# Patient Record
Sex: MALE | Race: WHITE | URBAN - METROPOLITAN AREA
[De-identification: names, ages, dates, MRNs, and addresses within clinical notes are randomized per-mention and may not be internally consistent; named-entity substitution may affect disease eponyms.]

---

## 2017-03-06 ENCOUNTER — TELEPHONE (OUTPATIENT)
Dept: INTERNAL MEDICINE CLINIC | Facility: CLINIC | Age: 67
End: 2017-03-06

## 2017-03-06 RX ORDER — AZITHROMYCIN 250 MG/1
TABLET, FILM COATED ORAL
Qty: 6 TABLET | Refills: 1 | Status: SHIPPED | OUTPATIENT
Start: 2017-03-06 | End: 2018-05-24

## 2017-03-06 NOTE — TELEPHONE ENCOUNTER
To Dr. Troy Fraga - pt's wife states pt needs refill of zpak to use, if needed, when traveling - pharmacy info updated.

## 2017-03-06 NOTE — TELEPHONE ENCOUNTER
Pt wife would like to speak to a nurse regarding rx. Wife stated pt usually gets in rx when travelling in antibiotic.

## 2017-03-07 NOTE — TELEPHONE ENCOUNTER
ERx sent for Zithromax (Z-nitesh) as directed x5d, 1RF; ERx sent to [address] in [place]; please call pt Carmleo Cisneros

## 2017-03-17 RX ORDER — PROMETHAZINE HYDROCHLORIDE AND CODEINE PHOSPHATE 6.25; 1 MG/5ML; MG/5ML
5 SYRUP ORAL EVERY 6 HOURS PRN
Qty: 180 ML | Refills: 1 | Status: SHIPPED
Start: 2017-03-17 | End: 2017-05-15

## 2017-03-17 NOTE — TELEPHONE ENCOUNTER
Patient is at the pharmacy now. Patient has a cough per pharmacist.  Wants cough syrup with codeine (has an understanding with the doctor). Patient recently got a Z-nitesh.

## 2017-03-28 NOTE — TELEPHONE ENCOUNTER
Pt called back and while he uses the Tylenol #3 very sparingly he needs it for pain control and the Ambien he needs for sleep assistance. Please advise  To MD:  The above refill request is for a controlled substance.   Please indicate yes or no to refill 30

## 2017-03-28 NOTE — TELEPHONE ENCOUNTER
[facility] faxed a refill request for: Tylenol #3 w/coedine,  Zolpidem 5 mg tabs #30 qty. Last filled: 02/19/17    Fax.  # [phone] Routed to Rx

## 2017-03-29 RX ORDER — ZOLPIDEM TARTRATE 5 MG/1
TABLET ORAL
Qty: 30 TABLET | Refills: 0 | COMMUNITY
Start: 2017-03-29 | End: 2017-04-24

## 2017-03-29 RX ORDER — ACETAMINOPHEN AND CODEINE PHOSPHATE 300; 30 MG/1; MG/1
TABLET ORAL
Qty: 30 TABLET | Refills: 0 | COMMUNITY
Start: 2017-03-29 | End: 2017-05-15

## 2017-03-29 NOTE — TELEPHONE ENCOUNTER
Ok to fill T#3: #30, no refill. Ok to fill [place] #30, no refill.      Further refills per Dr. Lilly [place]

## 2017-04-24 RX ORDER — ZOLPIDEM TARTRATE 5 MG/1
TABLET ORAL
Qty: 30 TABLET | Refills: 5 | Status: SHIPPED
Start: 2017-04-24 | End: 2017-10-16

## 2017-04-24 NOTE — TELEPHONE ENCOUNTER
To Dr. Pratik Ragland - see below. LMTCB for pt to verify if this is a permanent pharmacy move. To MD:  The above refill request is for a controlled substance. Please indicate yes or no to refill 30 days supply plus one refill.   If more refills are appropriat

## 2017-05-01 ENCOUNTER — TELEPHONE (OUTPATIENT)
Dept: INTERNAL MEDICINE CLINIC | Facility: CLINIC | Age: 67
End: 2017-05-01

## 2017-05-01 DIAGNOSIS — Z12.5 SCREENING PSA (PROSTATE SPECIFIC ANTIGEN): ICD-10-CM

## 2017-05-01 DIAGNOSIS — I10 BENIGN HYPERTENSION: ICD-10-CM

## 2017-05-01 DIAGNOSIS — Z00.00 PHYSICAL EXAM, ANNUAL: Primary | ICD-10-CM

## 2017-05-01 NOTE — TELEPHONE ENCOUNTER
Pt. Is calling to request an appt. For a physical on 05/12 or 05/15 pt.  Lives in [place] now   Ph. # [phone]

## 2017-05-15 ENCOUNTER — LAB ENCOUNTER (OUTPATIENT)
Dept: LAB | Age: 67
End: 2017-05-15
Attending: INTERNAL MEDICINE
Payer: COMMERCIAL

## 2017-05-15 ENCOUNTER — OFFICE VISIT (OUTPATIENT)
Dept: INTERNAL MEDICINE CLINIC | Facility: CLINIC | Age: 67
End: 2017-05-15

## 2017-05-15 VITALS
BODY MASS INDEX: 29.58 KG/M2 | HEIGHT: 70 IN | TEMPERATURE: 98 F | HEART RATE: 85 BPM | WEIGHT: 206.63 LBS | SYSTOLIC BLOOD PRESSURE: 160 MMHG | OXYGEN SATURATION: 97 % | DIASTOLIC BLOOD PRESSURE: 80 MMHG

## 2017-05-15 DIAGNOSIS — M54.16 LUMBAR RADICULOPATHY: ICD-10-CM

## 2017-05-15 DIAGNOSIS — Z12.5 SCREENING PSA (PROSTATE SPECIFIC ANTIGEN): ICD-10-CM

## 2017-05-15 DIAGNOSIS — Z00.00 PHYSICAL EXAM, ANNUAL: ICD-10-CM

## 2017-05-15 DIAGNOSIS — Z00.00 PHYSICAL EXAM, ANNUAL: Primary | ICD-10-CM

## 2017-05-15 DIAGNOSIS — R39.15 URINARY URGENCY: ICD-10-CM

## 2017-05-15 DIAGNOSIS — F51.01 PRIMARY INSOMNIA: ICD-10-CM

## 2017-05-15 DIAGNOSIS — R03.0 ELEVATED BP WITHOUT DIAGNOSIS OF HYPERTENSION: ICD-10-CM

## 2017-05-15 DIAGNOSIS — K21.9 GASTROESOPHAGEAL REFLUX DISEASE, ESOPHAGITIS PRESENCE NOT SPECIFIED: ICD-10-CM

## 2017-05-15 DIAGNOSIS — R07.9 CHEST PAIN, UNSPECIFIED TYPE: ICD-10-CM

## 2017-05-15 DIAGNOSIS — R94.31 ABNORMAL EKG: ICD-10-CM

## 2017-05-15 PROCEDURE — 99397 PER PM REEVAL EST PAT 65+ YR: CPT | Performed by: INTERNAL MEDICINE

## 2017-05-15 PROCEDURE — 85025 COMPLETE CBC W/AUTO DIFF WBC: CPT

## 2017-05-15 PROCEDURE — 87389 HIV-1 AG W/HIV-1&-2 AB AG IA: CPT

## 2017-05-15 PROCEDURE — 80053 COMPREHEN METABOLIC PANEL: CPT

## 2017-05-15 PROCEDURE — 84443 ASSAY THYROID STIM HORMONE: CPT

## 2017-05-15 PROCEDURE — 80061 LIPID PANEL: CPT

## 2017-05-15 PROCEDURE — 36415 COLL VENOUS BLD VENIPUNCTURE: CPT

## 2017-05-15 RX ORDER — PANTOPRAZOLE SODIUM 40 MG/1
TABLET, DELAYED RELEASE ORAL
Qty: 30 TABLET | Refills: 11 | Status: SHIPPED | OUTPATIENT
Start: 2017-05-15

## 2017-05-15 RX ORDER — LIDOCAINE 50 MG/G
1 PATCH TOPICAL
COMMUNITY

## 2017-05-15 RX ORDER — METHOCARBAMOL 750 MG/1
750 TABLET, FILM COATED ORAL EVERY 8 HOURS PRN
COMMUNITY
End: 2017-05-15

## 2017-05-15 RX ORDER — OLOPATADINE HYDROCHLORIDE 2 MG/ML
1 SOLUTION/ DROPS OPHTHALMIC DAILY
Qty: 5 ML | Refills: 5 | Status: SHIPPED | OUTPATIENT
Start: 2017-05-15

## 2017-05-15 RX ORDER — IBUPROFEN 800 MG/1
800 TABLET ORAL EVERY 8 HOURS PRN
Qty: 90 TABLET | Refills: 5 | Status: SHIPPED | OUTPATIENT
Start: 2017-05-15

## 2017-05-15 RX ORDER — DESONIDE 0.5 MG/G
CREAM TOPICAL
Qty: 60 G | Refills: 2 | Status: SHIPPED | OUTPATIENT
Start: 2017-05-15

## 2017-05-15 RX ORDER — ACETAMINOPHEN AND CODEINE PHOSPHATE 300; 30 MG/1; MG/1
TABLET ORAL
Qty: 60 TABLET | Refills: 5 | Status: SHIPPED | OUTPATIENT
Start: 2017-05-15

## 2017-05-15 RX ORDER — OXYBUTYNIN CHLORIDE 5 MG/1
5 TABLET ORAL 3 TIMES DAILY
Qty: 90 TABLET | Refills: 5 | Status: SHIPPED | OUTPATIENT
Start: 2017-05-15

## 2017-05-15 RX ORDER — METHOCARBAMOL 750 MG/1
750 TABLET, FILM COATED ORAL EVERY 8 HOURS PRN
Qty: 90 TABLET | Refills: 5 | Status: SHIPPED | OUTPATIENT
Start: 2017-05-15

## 2017-05-15 NOTE — PROGRESS NOTES
Abisai Ramsay is a 73year old male. HPI:   Patient presents with:  Physical  Back Pain: Was treated with oral prednisone and methocarbamol on May 4th in [place] for back pain. Back pain still present. Has started wearing a back brace.        74 y/o M here TIMES DAILY AS NEEDED Disp: 60 tablet Rfl: 5   Pantoprazole Sodium 40 MG Oral Tab EC TAKE ONE TABLET BY MOUTH EVERY MORNING BEFORE BREAKFAST Disp: 30 tablet Rfl: 11   ibuprofen 800 MG Oral Tab Take 1 tablet (800 mg total) by mouth every 8 (eight) hours as no weight loss; no fatigue  ENMT:  Negative for ear drainage, hearing loss and nasal drainage  Eyes:  Negative for eye discharge and vision loss  Cardiovascular:  Negative for chest pain; negative palpitations  Respiratory:  Negative for cough, dyspnea and physical therapy ordered; if has disc herniation, pt plans to see Dr Lulu Gates in [facility] for LESI; refilled Tylenol #3 po q4hrs prn, #60, 5RF, and methocarbamol 750 mg po q8hrs prn  Atypical chest pain  EKG with NSR, though Q waves in inferior leads tablet 5      Sig: Take 1 tablet (5 mg total) by mouth 3 (three) times daily.       Desonide 0.05 % External Cream 60 g 2      Sig: ATAA BID           Imaging & Referrals:  ELECTROCARDIOGRAM, COMPLETE     5/15/2017  Marleni Duval MD

## 2017-05-24 ENCOUNTER — TELEPHONE (OUTPATIENT)
Dept: INTERNAL MEDICINE CLINIC | Facility: CLINIC | Age: 67
End: 2017-05-24

## 2017-05-24 NOTE — TELEPHONE ENCOUNTER
[address] [facility]  Need clarification on order/with or without contrast??   Please fax corrected order to include above to FAX#[phone]  Tasked to nursing

## 2017-05-31 ENCOUNTER — TELEPHONE (OUTPATIENT)
Dept: INTERNAL MEDICINE CLINIC | Facility: CLINIC | Age: 67
End: 2017-05-31

## 2017-05-31 NOTE — TELEPHONE ENCOUNTER
Imp- OA spine and lumbar spinal stenosis based on MRI L-spine; Rec- pt notified of results; he will pursue physical therapy

## 2017-06-01 NOTE — TELEPHONE ENCOUNTER
Pt needs additional copy of PT order, hospital lost please send to pt, can be included envelope with lab results and MRI   [address] [facility]   [address]  Mobile Phone: [facility]/Zip: [place] [address]    Tasked to Dr Cristy Bennett

## 2017-06-02 NOTE — TELEPHONE ENCOUNTER
Spoke with pt's wife, Joann Christopher informed her that Rx was sent in mail by Dr. Erick Diaz, she verbalized understanding and will wait for mail.

## 2017-06-02 NOTE — TELEPHONE ENCOUNTER
[facility] is calling to have order faxed ph. # [phone] attn: Tori Walker   Ph. # [phone]    She also wants to know if we received his ultrasound results  Routed to clinical

## 2017-06-02 NOTE — TELEPHONE ENCOUNTER
[facility] did find PT order and is holding it in case pt's wife wants us to fax directly to PT facility.

## 2017-10-16 ENCOUNTER — TELEPHONE (OUTPATIENT)
Dept: INTERNAL MEDICINE CLINIC | Facility: CLINIC | Age: 67
End: 2017-10-16

## 2017-10-19 RX ORDER — ZOLPIDEM TARTRATE 5 MG/1
TABLET ORAL
Qty: 30 TABLET | Refills: 5 | Status: SHIPPED
Start: 2017-10-19

## 2017-10-19 NOTE — TELEPHONE ENCOUNTER
[facility] is calling to check the status of [facility] refill on Zolpiden pt.  Is going out of town and will be leaving today  Ph. # [phone]   Routed to Rx

## 2018-05-23 RX ORDER — OLOPATADINE HYDROCHLORIDE 2 MG/ML
1 SOLUTION/ DROPS OPHTHALMIC DAILY
Qty: 5 ML | Refills: 5 | Status: CANCELLED | OUTPATIENT
Start: 2018-05-23

## 2021-03-15 DIAGNOSIS — Z23 NEED FOR VACCINATION: ICD-10-CM

## 2021-04-22 NOTE — ADDENDUM NOTE
Addended by: Job Dixon on: 4/24/2017 10:43 AM     Modules accepted: Orders
Addended by: Padmini Swartz on: 4/24/2017 07:40 PM     Modules accepted: Orders
no

## (undated) NOTE — MR AVS SNAPSHOT
[facility] [address] [address]  [phone]               Thank you for choosing us for your health care visit with Kaycee Sr MD.  We are glad to serve you and happy to provide you with this summary of your visit.   Ple What changed: You were already taking a medication with the same name, and this prescription was added. Make sure you understand how and when to take each.    Commonly known as:  DESOWEN           Diclofenac Sodium 1 % Gel   Apply topically daily as needed - ibuprofen 800 MG Tabs  - methocarbamol 750 MG Tabs  - Olopatadine HCl 0.2 % Soln  - Oxybutynin Chloride 5 MG Tabs  - Pantoprazole Sodium 40 MG Tbec            Results of Recent Testing       MyChart               Educational Information     Healthy Diet